# Patient Record
Sex: MALE | Race: BLACK OR AFRICAN AMERICAN | NOT HISPANIC OR LATINO | Employment: UNEMPLOYED | URBAN - METROPOLITAN AREA
[De-identification: names, ages, dates, MRNs, and addresses within clinical notes are randomized per-mention and may not be internally consistent; named-entity substitution may affect disease eponyms.]

---

## 2024-04-01 ENCOUNTER — HOSPITAL ENCOUNTER (EMERGENCY)
Facility: CLINIC | Age: 29
Discharge: HOME | End: 2024-04-01
Attending: EMERGENCY MEDICINE | Admitting: EMERGENCY MEDICINE

## 2024-04-01 VITALS
RESPIRATION RATE: 16 BRPM | TEMPERATURE: 97.1 F | HEART RATE: 55 BPM | SYSTOLIC BLOOD PRESSURE: 120 MMHG | BODY MASS INDEX: 25.83 KG/M2 | OXYGEN SATURATION: 99 % | HEIGHT: 74 IN | WEIGHT: 201.28 LBS | DIASTOLIC BLOOD PRESSURE: 81 MMHG

## 2024-04-01 DIAGNOSIS — L03.011 PARONYCHIA OF FINGER OF RIGHT HAND: ICD-10-CM

## 2024-04-01 PROCEDURE — 99283 EMERGENCY DEPT VISIT LOW MDM: CPT | Mod: 25

## 2024-04-01 PROCEDURE — 10060 I&D ABSCESS SIMPLE/SINGLE: CPT

## 2024-04-01 RX ORDER — LIDOCAINE HYDROCHLORIDE 10 MG/ML
INJECTION, SOLUTION EPIDURAL; INFILTRATION; INTRACAUDAL; PERINEURAL
Status: DISCONTINUED
Start: 2024-04-01 | End: 2024-04-01 | Stop reason: HOSPADM

## 2024-04-01 ASSESSMENT — ACTIVITIES OF DAILY LIVING (ADL): ADLS_ACUITY_SCORE: 33

## 2024-04-01 NOTE — ED PROVIDER NOTES
"  History     Chief Complaint:  Finger Pain       HPI   Vicky Luna is a 28 year old male who presents with finger pain. 4 days ago the patient noticed swelling and pain in his right pinky finger that persisted throughout the weekend. He states he does bite his nails and is worried that it is infected.     Independent Historian:   None - Patient Only    Review of External Notes:   None      Medications:    The patient is not taking any routine medications     Past Medical History:    None     Physical Exam   Patient Vitals for the past 24 hrs:   BP Temp Temp src Pulse Resp SpO2 Height Weight   04/01/24 0144 120/81 97.1  F (36.2  C) Temporal 55 16 99 % 1.88 m (6' 2\") 91.3 kg (201 lb 4.5 oz)        Physical Exam  General: Patient is alert, awake and interactive  Head: The scalp, face, and head appear normal  Eyes: Conjunctivae are normal  ENT: The nose is normal, Pinnae are normal, External acoustic canals are normal  Neck: Trachea midline  CV: Pulses are normal.   Resp: No respiratory distress   Musc: Normal muscular tone, moving all extremities.  Right hand Exam:  Tenderness, swelling, erythema and underlying fluctuance located along the cuticle of the right pinky finger  MCP: full flex/ext  PIP: full flex/ext  DIP: full flex/ext  Cap refil: < 2 seconds  Sensation: Intact to light touch  Radial pulse normal  Ulnar pulse  normal  Skin: No rash or lesions noted  Neuro:  Speech is normal and fluent. Face is symmetric.   Psych: Normal affect.  Appropriate interactions.    Emergency Department Course     [facility]    PROCEDURE: -Incision/Drainage    Date/Time: 4/1/2024 2:33 AM    Performed by: Tu Sandoval MD  Authorized by: Tu Sandoval MD    Risks, benefits and alternatives discussed.      LOCATION:      Indications for incision and drainage: Paronychia.    PRE-PROCEDURE DETAILS:     Skin preparation:  Chloraprep    PROCEDURE TYPE:     Complexity:  " Simple    ANESTHESIA (see MAR for exact dosages):     Anesthesia method:  Nerve block    Block location:  Right fifth finger    Block needle gauge:  27 G    Block anesthetic:  Lidocaine 1% w/o epi    Block technique:  Digital    Block injection procedure:  Anatomic landmarks identified    Block outcome:  Anesthesia achieved    PROCEDURE DETAILS:     Needle aspiration: yes      Needle size:  18 G    Incision types:  Stab incision    Scalpel size: 18-gauge needle.    Drainage:  Purulent    Drainage amount:  Moderate    Wound treatment:  Wound left open    PROCEDURE    Patient Tolerance:  Patient tolerated the procedure well with no immediate complications       Emergency Department Course & Assessments:    Disposition:  The patient was discharged.     Impression & Plan      Medical Decision Making:  Vicky Luna is a 28 year old male who presents for evaluation of a painful fifth finger. This is consistent with a paronychia.  Incision and drainage yielded pus, the nail bed was also decompressed.  No signs of lymphangitis, marked cellulitis, gangrene, or other worrisome soft tissue/bony issue at this time.  Plan to f/u primary for wound recheck. Stable for discharge.       Diagnosis:    ICD-10-CM    1. Paronychia of finger of right hand  L03.011            Scribe Disclosure:  Jose Maria TRUONG, am serving as a scribe at 1:55 AM on 4/1/2024 to document services personally performed by Tu Sandoval MD based on my observations and the provider's statements to me.     4/1/2024   Tu Sandoval MD Battista, Christopher Joseph, MD  04/01/24 [id number]